# Patient Record
Sex: MALE | URBAN - METROPOLITAN AREA
[De-identification: names, ages, dates, MRNs, and addresses within clinical notes are randomized per-mention and may not be internally consistent; named-entity substitution may affect disease eponyms.]

---

## 2021-10-13 ENCOUNTER — EMERGENCY (EMERGENCY)
Facility: HOSPITAL | Age: 46
LOS: 1 days | Discharge: LEFT BEFORE DISCHARGE | End: 2021-10-13
Attending: EMERGENCY MEDICINE
Payer: COMMERCIAL

## 2021-10-13 VITALS
RESPIRATION RATE: 16 BRPM | WEIGHT: 315 LBS | TEMPERATURE: 98 F | SYSTOLIC BLOOD PRESSURE: 110 MMHG | HEART RATE: 76 BPM | DIASTOLIC BLOOD PRESSURE: 60 MMHG | HEIGHT: 71 IN

## 2021-10-13 PROCEDURE — 99283 EMERGENCY DEPT VISIT LOW MDM: CPT

## 2021-10-13 PROCEDURE — 99284 EMERGENCY DEPT VISIT MOD MDM: CPT

## 2021-10-13 RX ORDER — ACETAMINOPHEN 500 MG
650 TABLET ORAL ONCE
Refills: 0 | Status: DISCONTINUED | OUTPATIENT
Start: 2021-10-13 | End: 2021-10-13

## 2021-10-13 RX ORDER — SODIUM CHLORIDE 9 MG/ML
1000 INJECTION INTRAMUSCULAR; INTRAVENOUS; SUBCUTANEOUS ONCE
Refills: 0 | Status: DISCONTINUED | OUTPATIENT
Start: 2021-10-13 | End: 2021-10-13

## 2021-10-13 NOTE — ED PROVIDER NOTE - PROGRESS NOTE DETAILS
patient refusing imaging of head, ekg or labs wants to leave likely vasovagal but to ama with no work up, given pmd and plastic face follow up.

## 2021-10-13 NOTE — ED ADULT NURSE NOTE - OBJECTIVE STATEMENT
46 year old male presents to ED s/p syncopal episode. Pt was upstairs with his dad who is a pt and started to feel light headed and ultimately had a syncopal episode. Pt hit his nose and has a small abrasion with deviation of his nose. He denies chest pain, shortness of breath, headache, nausea, vomiting, diarrhaea, abdominal pain, fever, chills, urinary symptoms, lightheadedness, dizziness, changes in vision. He states he has numerous episodes of vasovagal in the past worked up with ct/mri and cards but no result has ever been found

## 2021-10-13 NOTE — ED PROVIDER NOTE - NSFOLLOWUPCLINICS_GEN_ALL_ED_FT
[facility]  [facility]  [address]  [place] [address]  Phone: [phone]  Fax:   Follow Up Time: 1-3 Days

## 2021-10-13 NOTE — ED PROVIDER NOTE - PATIENT PORTAL LINK FT
You can access the FollowMyHealth Patient Portal offered by [facility] by registering at the following website: [email or web address]. By joining [facility]’s FollowMyHealth portal, you will also be able to view your health information using other applications (apps) compatible with our system.

## 2021-10-13 NOTE — ED ADULT TRIAGE NOTE - RESPIRATORY RATE (BREATHS/MIN)
16 5-Fu Counseling: 5-Fluorouracil Counseling:  I discussed with the patient the risks of 5-fluorouracil including but not limited to erythema, scaling, itching, weeping, crusting, and pain.

## 2021-10-13 NOTE — ED PROVIDER NOTE - RESPIRATORY NEGATIVE STATEMENT, MLM
José Manuel Webb   DATE: February 23, 2018 MRN: [id number]   TIME: 10:06 AM PAGER/CONTACT #: [phone]     I have independently seen and examined this patient. I have reviewed and agree with the above documentation, assessment and plan.      Emmy Urena MD  12:13 PM  February 23, 2018 no chest pain, no cough, and no shortness of breath.

## 2021-10-13 NOTE — ED PROVIDER NOTE - OBJECTIVE STATEMENT
pt is a 47 y/o male was upstairs with his dad going into surgery felt light headed and sat down followed up a brief period of loc. pt hit his nose with small abrasion with deviation of his nose, no headache or other complaints at this time, wants to go home. no fever or chills. pt with no cp or sob.  pt states he has numerous episodes of vasovagal in the past worked up with ct/mri and cards.

## 2021-10-13 NOTE — ED PROVIDER NOTE - ENMT, MLM
Airway patent, Nasal mucosa clear. Mouth with normal mucosa. Throat has no vesicles, no oropharyngeal exudates and uvula is midline. nasal bridge with deviation to the left side with swelling and mild tend to nasal bridge no facial tend otherwise. Airway patent, Nasal mucosa clear. Mouth with normal mucosa. Throat has no vesicles, no oropharyngeal exudates and uvula is midline. nasal bridge with deviation to the left side with swelling and mild tend to nasal bridge no facial tend otherwise. superficial laceration of nasal bridge

## 2021-10-18 ENCOUNTER — APPOINTMENT (OUTPATIENT)
Dept: PLASTIC SURGERY | Facility: CLINIC | Age: 46
End: 2021-10-18
Payer: COMMERCIAL

## 2021-10-18 VITALS — HEIGHT: 71 IN | WEIGHT: 170 LBS | BODY MASS INDEX: 23.8 KG/M2 | TEMPERATURE: 97.3 F

## 2021-10-18 DIAGNOSIS — J34.2 DEVIATED NASAL SEPTUM: ICD-10-CM

## 2021-10-18 PROBLEM — Z00.00 ENCOUNTER FOR PREVENTIVE HEALTH EXAMINATION: Status: ACTIVE | Noted: 2021-10-18

## 2021-10-18 PROCEDURE — 99203 OFFICE O/P NEW LOW 30 MIN: CPT

## 2021-11-01 ENCOUNTER — APPOINTMENT (OUTPATIENT)
Dept: PLASTIC SURGERY | Facility: CLINIC | Age: 46
End: 2021-11-01

## 2021-11-05 NOTE — ED PROVIDER NOTE - NEURO NEGATIVE STATEMENT, MLM
Winsome Patricio was seen and treated in our emergency department on 11/5/2021. He may return to work on 11/06/2021. If you have any questions or concerns, please don't hesitate to call.       Nohemi Dickens MD no loss of consciousness, no gait abnormality, no headache, no sensory deficits, and no weakness.

## 2021-11-21 NOTE — HISTORY OF PRESENT ILLNESS
[FreeTextEntry1] : Problem - Patient fainted 10/13/21 and fell on face injuring nose\par Patient has been seen by by [facility]\par No bleeding.\par No Imaging/Biopsy.\par No changes in size or color.\par Nose is inflamed. \par Pain when nose touched or moved.\par No problems breathing through nose before or after fall. \par \par